# Patient Record
Sex: FEMALE | Race: WHITE | URBAN - NONMETROPOLITAN AREA
[De-identification: names, ages, dates, MRNs, and addresses within clinical notes are randomized per-mention and may not be internally consistent; named-entity substitution may affect disease eponyms.]

---

## 2019-02-11 ENCOUNTER — OFFICE VISIT (OUTPATIENT)
Dept: URBAN - NONMETROPOLITAN AREA CLINIC 10 | Facility: CLINIC | Age: 39
End: 2019-02-11
Payer: COMMERCIAL

## 2019-02-11 DIAGNOSIS — H04.123 DRY EYE SYNDROME OF BILATERAL LACRIMAL GLANDS: ICD-10-CM

## 2019-02-11 PROCEDURE — 92004 COMPRE OPH EXAM NEW PT 1/>: CPT | Performed by: OPTOMETRIST

## 2019-02-11 RX ORDER — NEOMYCIN SULFATE, POLYMYXIN B SULFATE AND DEXAMETHASONE 3.5; 10000; 1 MG/ML; [USP'U]/ML; MG/ML
SUSPENSION OPHTHALMIC
Qty: 1 | Refills: 0 | Status: INACTIVE
Start: 2019-02-11 | End: 2019-02-25

## 2019-02-11 ASSESSMENT — KERATOMETRY
OD: 42.88
OS: 43.50

## 2019-02-11 ASSESSMENT — VISUAL ACUITY
OD: 20/20
OS: 20/20

## 2019-02-11 ASSESSMENT — INTRAOCULAR PRESSURE
OS: 15
OD: 15

## 2019-02-11 NOTE — IMPRESSION/PLAN
Impression: Meibomian gland dysfunction of left eye, unspecified eyelid: H02.886. Plan: Diagnosis discussed, explained that symptoms are caused by MGD. Start maxitrol 1 gtts QID x 10 days.

## 2019-02-25 ENCOUNTER — OFFICE VISIT (OUTPATIENT)
Dept: URBAN - NONMETROPOLITAN AREA CLINIC 10 | Facility: CLINIC | Age: 39
End: 2019-02-25
Payer: COMMERCIAL

## 2019-02-25 DIAGNOSIS — H02.886 MEIBOMIAN GLAND DYSFUNCTION OF LEFT EYE, UNSPECIFIED EYELID: ICD-10-CM

## 2019-02-25 DIAGNOSIS — H52.13 MYOPIA, BILATERAL: Primary | ICD-10-CM

## 2019-02-25 PROCEDURE — 99213 OFFICE O/P EST LOW 20 MIN: CPT | Performed by: OPTOMETRIST

## 2019-02-25 ASSESSMENT — KERATOMETRY
OD: 43.00
OS: 43.38

## 2019-02-25 ASSESSMENT — VISUAL ACUITY
OS: 20/20
OD: 20/20

## 2021-09-24 ENCOUNTER — OFFICE VISIT (OUTPATIENT)
Dept: URBAN - NONMETROPOLITAN AREA CLINIC 10 | Facility: CLINIC | Age: 41
End: 2021-09-24
Payer: COMMERCIAL

## 2021-09-24 DIAGNOSIS — H02.88A MEIBOMIAN GLAND DYSFUNCTION OF RIGHT EYE, UPPER AND LOWER EYELIDS: ICD-10-CM

## 2021-09-24 DIAGNOSIS — H02.88B MEIBOMIAN GLAND DYSFUNCTION OF LT EYE, UPPER AND LOWER EYELIDS: Primary | ICD-10-CM

## 2021-09-24 PROCEDURE — 99214 OFFICE O/P EST MOD 30 MIN: CPT | Performed by: OPTOMETRIST

## 2021-09-24 ASSESSMENT — KERATOMETRY
OS: 43.00
OD: 42.63

## 2021-09-24 ASSESSMENT — VISUAL ACUITY
OD: 20/20
OS: 20/20

## 2021-09-24 ASSESSMENT — INTRAOCULAR PRESSURE
OD: 13
OS: 15

## 2021-09-24 NOTE — IMPRESSION/PLAN
Impression: Meibomian gland dysfunction of lt eye, upper and lower eyelids: H02.88B. Plan: Pt's dry eye symptoms in part due to meibomian gland dysfunction and evaporative dry eye. Recommend warm compresses (i.e. Frandy Mask) 10min OU with lid massage OU . Pt ed on long-term treatment for improvement of symptoms.

## 2021-09-24 NOTE — IMPRESSION/PLAN
Impression: Meibomian gland dysfunction of right eye, upper and lower eyelids: H02.88A. Plan: Pt's dry eye symptoms in part due to meibomian gland dysfunction and evaporative dry eye. Recommend warm compresses (i.e. Frandy Mask) 10min OU with lid massage OU . Pt ed on long-term treatment for improvement of symptoms.

## 2021-10-15 ENCOUNTER — OFFICE VISIT (OUTPATIENT)
Dept: URBAN - NONMETROPOLITAN AREA CLINIC 10 | Facility: CLINIC | Age: 41
End: 2021-10-15
Payer: COMMERCIAL

## 2021-10-15 PROCEDURE — 99214 OFFICE O/P EST MOD 30 MIN: CPT | Performed by: STUDENT IN AN ORGANIZED HEALTH CARE EDUCATION/TRAINING PROGRAM

## 2021-10-15 RX ORDER — PREDNISOLONE ACETATE 10 MG/ML
1 % SUSPENSION/ DROPS OPHTHALMIC
Qty: 5 | Refills: 0 | Status: INACTIVE
Start: 2021-10-15 | End: 2022-03-10

## 2021-10-15 ASSESSMENT — VISUAL ACUITY
OD: 20/20
OS: 20/20

## 2021-10-15 ASSESSMENT — KERATOMETRY
OD: 42.63
OS: 43.13

## 2021-10-15 NOTE — IMPRESSION/PLAN
Impression: Keratoconjunct sicca, not specified as Sjogren's, bilateral: T85.317. Plan: Patient educated on diagnosis. Recommend preservative-free artificial tears (Refresh Optive) 6x/day OU and warm compresses bid for 10 minutes. Additionally recommend lubricating ointment at bedtime in both eyes (Lacrilube, Refresh PM, Genteal). Additionally recommend warm compresses (with clean washcloth or clean sock filled with uncooked rice in microwave for ~45 seconds over eyes for about 10 minutes). DWP options of Restasis, Xiidra, punctal plugs, or soft steroid as second line treatment options. 

Addt rx Pred Acetate BID x 10 days then stop

## 2022-03-10 ENCOUNTER — OFFICE VISIT (OUTPATIENT)
Dept: URBAN - NONMETROPOLITAN AREA CLINIC 10 | Facility: CLINIC | Age: 42
End: 2022-03-10
Payer: COMMERCIAL

## 2022-03-10 DIAGNOSIS — H16.223 KERATOCONJUNCT SICCA, NOT SPECIFIED AS SJOGREN'S, BILATERAL: Primary | ICD-10-CM

## 2022-03-10 PROCEDURE — 99213 OFFICE O/P EST LOW 20 MIN: CPT | Performed by: STUDENT IN AN ORGANIZED HEALTH CARE EDUCATION/TRAINING PROGRAM

## 2022-03-10 RX ORDER — ERYTHROMYCIN 5 MG/G
OINTMENT OPHTHALMIC
Qty: 3.5 | Refills: 1 | Status: ACTIVE
Start: 2022-03-10

## 2022-03-10 ASSESSMENT — INTRAOCULAR PRESSURE
OS: 14
OD: 16

## 2022-03-10 NOTE — IMPRESSION/PLAN
Impression: Keratoconjunct sicca, not specified as Sjogren's, bilateral: E03.436.
-patient still symptomatic despite pred forte burst treatment. Plan: Patient educated on diagnosis. Recommend artificial tears ( Systane, Refresh, FreshKote, TheraTears, Soothe) 3-4x/day OU and warm compresses 2x/day for 10 minutes at a time (with clean washcloth or clean sock filled with uncooked rice in microwave for 45-60 seconds). - Discussed punctal plugs, Restasis, Xiidra as second line treatment options Rx erythromycin laurence QHS OU x 2 weeks. Will consider punctal plugs at next visit if no improvement

## 2022-05-04 ENCOUNTER — OFFICE VISIT (OUTPATIENT)
Dept: URBAN - NONMETROPOLITAN AREA CLINIC 10 | Facility: CLINIC | Age: 42
End: 2022-05-04
Payer: COMMERCIAL

## 2022-05-04 DIAGNOSIS — H16.223 KERATOCONJUNCT SICCA, NOT SPECIFIED AS SJOGREN'S, BILATERAL: Primary | ICD-10-CM

## 2022-05-04 DIAGNOSIS — H02.88A MEIBOMIAN GLAND DYSFUNCTION OF RIGHT EYE, UPPER AND LOWER EYELIDS: ICD-10-CM

## 2022-05-04 DIAGNOSIS — H02.88B MEIBOMIAN GLAND DYSFUNCTION OF LT EYE, UPPER AND LOWER EYELIDS: ICD-10-CM

## 2022-05-04 PROCEDURE — 99213 OFFICE O/P EST LOW 20 MIN: CPT | Performed by: STUDENT IN AN ORGANIZED HEALTH CARE EDUCATION/TRAINING PROGRAM

## 2022-05-04 NOTE — IMPRESSION/PLAN
Impression: Meibomian gland dysfunction of right eye, upper and lower eyelids: H02.88A. Plan: see above.

## 2022-05-04 NOTE — IMPRESSION/PLAN
Impression: Keratoconjunct sicca, not specified as Sjogren's, bilateral: S26.056. Plan: Patient ed on findings. Patient to continue use of erythromycin until tube runs out, and continue using ATs QID and warm compress BID. Patient ed that condition is improving each time I have seen her. RTC if worsening, or 6 months for complete. Will do punctal plugs if plateaus and patient is still uncomfortable.

## 2022-05-04 NOTE — IMPRESSION/PLAN
Impression: Meibomian gland dysfunction of lt eye, upper and lower eyelids: H02.88B.  Plan: see above

## 2024-11-21 ENCOUNTER — OFFICE VISIT (OUTPATIENT)
Dept: URBAN - NONMETROPOLITAN AREA CLINIC 10 | Facility: CLINIC | Age: 44
End: 2024-11-21
Payer: COMMERCIAL

## 2024-11-21 DIAGNOSIS — H52.223 REGULAR ASTIGMATISM, BILATERAL: ICD-10-CM

## 2024-11-21 DIAGNOSIS — H16.223 KERATOCONJUNCT SICCA, NOT SPECIFIED AS SJOGREN'S, BILATERAL: Primary | ICD-10-CM

## 2024-11-21 PROCEDURE — 92014 COMPRE OPH EXAM EST PT 1/>: CPT | Performed by: OPTOMETRIST

## 2024-11-21 ASSESSMENT — VISUAL ACUITY
OD: 20/20
OS: 20/20

## 2024-11-21 ASSESSMENT — INTRAOCULAR PRESSURE
OD: 20
OS: 20